# Patient Record
Sex: FEMALE | Race: WHITE
[De-identification: names, ages, dates, MRNs, and addresses within clinical notes are randomized per-mention and may not be internally consistent; named-entity substitution may affect disease eponyms.]

---

## 2016-11-09 VITALS — DIASTOLIC BLOOD PRESSURE: 62 MMHG | SYSTOLIC BLOOD PRESSURE: 126 MMHG

## 2017-05-24 ENCOUNTER — HOSPITAL ENCOUNTER (OUTPATIENT)
Dept: HOSPITAL 61 - LAB | Age: 72
Discharge: HOME | End: 2017-05-24
Attending: NURSE PRACTITIONER
Payer: MEDICARE

## 2017-05-24 DIAGNOSIS — E03.9: ICD-10-CM

## 2017-05-24 DIAGNOSIS — E78.5: ICD-10-CM

## 2017-05-24 DIAGNOSIS — E11.9: Primary | ICD-10-CM

## 2017-05-24 LAB
ALBUMIN SERPL-MCNC: 3.6 G/DL (ref 3.4–5)
ALBUMIN/GLOB SERPL: 0.9 {RATIO} (ref 1–1.7)
ALP SERPL-CCNC: 59 U/L (ref 46–116)
ALT SERPL-CCNC: 23 U/L (ref 14–59)
ANION GAP SERPL CALC-SCNC: 9 MMOL/L (ref 6–14)
AST SERPL-CCNC: 22 U/L (ref 15–37)
BILIRUB SERPL-MCNC: 0.4 MG/DL (ref 0.2–1)
BUN SERPL-MCNC: 20 MG/DL (ref 7–20)
BUN/CREAT SERPL: 20 (ref 6–20)
CALCIUM SERPL-MCNC: 9 MG/DL (ref 8.5–10.1)
CHLORIDE SERPL-SCNC: 104 MMOL/L (ref 98–107)
CHOLEST SERPL-MCNC: 130 MG/DL (ref 0–200)
CHOLEST/HDLC SERPL: 2.5 {RATIO}
CO2 SERPL-SCNC: 28 MMOL/L (ref 21–32)
CREAT SERPL-MCNC: 1 MG/DL (ref 0.6–1)
GFR SERPLBLD BASED ON 1.73 SQ M-ARVRAT: 54.3 ML/MIN
GLOBULIN SER-MCNC: 3.8 G/DL (ref 2.2–3.8)
GLUCOSE SERPL-MCNC: 161 MG/DL (ref 70–99)
HDLC SERPL-MCNC: 53 MG/DL (ref 40–60)
NONHDLC SERPL-MCNC: 77 MG/DL (ref 0–129)
POTASSIUM SERPL-SCNC: 4.3 MMOL/L (ref 3.5–5.1)
PROT SERPL-MCNC: 7.4 G/DL (ref 6.4–8.2)
SODIUM SERPL-SCNC: 141 MMOL/L (ref 136–145)
TRIGL SERPL-MCNC: 168 MG/DL (ref 0–150)

## 2017-05-24 PROCEDURE — 84443 ASSAY THYROID STIM HORMONE: CPT

## 2017-05-24 PROCEDURE — 80061 LIPID PANEL: CPT

## 2017-05-24 PROCEDURE — 36415 COLL VENOUS BLD VENIPUNCTURE: CPT

## 2017-05-24 PROCEDURE — 83036 HEMOGLOBIN GLYCOSYLATED A1C: CPT

## 2017-05-24 PROCEDURE — 80053 COMPREHEN METABOLIC PANEL: CPT

## 2017-07-28 ENCOUNTER — HOSPITAL ENCOUNTER (EMERGENCY)
Dept: HOSPITAL 61 - ER | Age: 72
Discharge: HOME | End: 2017-07-28
Payer: MEDICARE

## 2017-07-28 VITALS — SYSTOLIC BLOOD PRESSURE: 150 MMHG | DIASTOLIC BLOOD PRESSURE: 67 MMHG

## 2017-07-28 VITALS — BODY MASS INDEX: 34.15 KG/M2 | HEIGHT: 64 IN | WEIGHT: 200 LBS

## 2017-07-28 DIAGNOSIS — E11.9: ICD-10-CM

## 2017-07-28 DIAGNOSIS — R42: Primary | ICD-10-CM

## 2017-07-28 DIAGNOSIS — E03.9: ICD-10-CM

## 2017-07-28 DIAGNOSIS — Z88.1: ICD-10-CM

## 2017-07-28 DIAGNOSIS — I10: ICD-10-CM

## 2017-07-28 DIAGNOSIS — E78.00: ICD-10-CM

## 2017-07-28 DIAGNOSIS — R51: ICD-10-CM

## 2017-07-28 LAB
ALBUMIN SERPL-MCNC: 3.9 G/DL (ref 3.4–5)
ALP SERPL-CCNC: 64 U/L (ref 46–116)
ALT SERPL-CCNC: 23 U/L (ref 14–59)
ANION GAP SERPL CALC-SCNC: 10 MMOL/L (ref 6–14)
AST SERPL-CCNC: 19 U/L (ref 15–37)
BACTERIA #/AREA URNS HPF: 0 /HPF
BASOPHILS # BLD AUTO: 0.1 X10^3/UL (ref 0–0.2)
BASOPHILS NFR BLD: 1 % (ref 0–3)
BILIRUB DIRECT SERPL-MCNC: 0.1 MG/DL (ref 0–0.2)
BILIRUB SERPL-MCNC: 0.3 MG/DL (ref 0.2–1)
BILIRUB UR QL STRIP: NEGATIVE
BUN SERPL-MCNC: 17 MG/DL (ref 7–20)
CALCIUM SERPL-MCNC: 9.3 MG/DL (ref 8.5–10.1)
CHLORIDE SERPL-SCNC: 99 MMOL/L (ref 98–107)
CO2 SERPL-SCNC: 30 MMOL/L (ref 21–32)
CREAT SERPL-MCNC: 1 MG/DL (ref 0.6–1)
EOSINOPHIL NFR BLD: 3 % (ref 0–3)
ERYTHROCYTE [DISTWIDTH] IN BLOOD BY AUTOMATED COUNT: 12.7 % (ref 11.5–14.5)
GFR SERPLBLD BASED ON 1.73 SQ M-ARVRAT: 54.3 ML/MIN
GLUCOSE SERPL-MCNC: 139 MG/DL (ref 70–99)
GLUCOSE UR STRIP-MCNC: NEGATIVE MG/DL
HCT VFR BLD CALC: 35.7 % (ref 36–47)
HGB BLD-MCNC: 12.2 G/DL (ref 12–15.5)
LYMPHOCYTES # BLD: 2.1 X10^3/UL (ref 1–4.8)
LYMPHOCYTES NFR BLD AUTO: 30 % (ref 24–48)
MCH RBC QN AUTO: 32 PG (ref 25–35)
MCHC RBC AUTO-ENTMCNC: 34 G/DL (ref 31–37)
MCV RBC AUTO: 92 FL (ref 79–100)
MONOCYTES NFR BLD: 10 % (ref 0–9)
NEUTROPHILS NFR BLD AUTO: 56 % (ref 31–73)
NITRITE UR QL STRIP: NEGATIVE
PH UR STRIP: 7 [PH]
PLATELET # BLD AUTO: 207 X10^3/UL (ref 140–400)
POTASSIUM SERPL-SCNC: 3.6 MMOL/L (ref 3.5–5.1)
PROT SERPL-MCNC: 7.5 G/DL (ref 6.4–8.2)
PROT UR STRIP-MCNC: NEGATIVE MG/DL
RBC # BLD AUTO: 3.88 X10^6/UL (ref 3.5–5.4)
RBC #/AREA URNS HPF: 0 /HPF (ref 0–2)
SODIUM SERPL-SCNC: 139 MMOL/L (ref 136–145)
SP GR UR STRIP: 1.01
SQUAMOUS #/AREA URNS LPF: (no result) /LPF
UROBILINOGEN UR-MCNC: 0.2 MG/DL
WBC # BLD AUTO: 7 X10^3/UL (ref 4–11)
WBC #/AREA URNS HPF: (no result) /HPF (ref 0–4)

## 2017-07-28 PROCEDURE — 70496 CT ANGIOGRAPHY HEAD: CPT

## 2017-07-28 PROCEDURE — 99285 EMERGENCY DEPT VISIT HI MDM: CPT

## 2017-07-28 PROCEDURE — 96360 HYDRATION IV INFUSION INIT: CPT

## 2017-07-28 PROCEDURE — 80048 BASIC METABOLIC PNL TOTAL CA: CPT

## 2017-07-28 PROCEDURE — 81001 URINALYSIS AUTO W/SCOPE: CPT

## 2017-07-28 PROCEDURE — 70498 CT ANGIOGRAPHY NECK: CPT

## 2017-07-28 PROCEDURE — 36415 COLL VENOUS BLD VENIPUNCTURE: CPT

## 2017-07-28 PROCEDURE — 84484 ASSAY OF TROPONIN QUANT: CPT

## 2017-07-28 PROCEDURE — 85027 COMPLETE CBC AUTOMATED: CPT

## 2017-07-28 PROCEDURE — 80076 HEPATIC FUNCTION PANEL: CPT

## 2017-07-28 PROCEDURE — 93005 ELECTROCARDIOGRAM TRACING: CPT

## 2017-07-28 NOTE — EKG
[facility]

              [address] [place] [address]

Test Date:    2017               Test Time:    12:38:33

Pat Name:     LILIANA SHAIKH           Department:   

Patient ID:   [id number]           Room:          

Gender:       F                        Technician:   

:          1945               Requested By: ORLANDO ZHOU

Order Number: [id number]            Reading MD:   Howard Harris

                                 Measurements

Intervals                              Axis          

Rate:         66                       P:            32

NE:           160                      QRS:          -28

QRSD:         90                       T:            13

QT:           426                                    

QTc:          448                                    

                           Interpretive Statements

SINUS RHYTHM

LEFTWARD AXIS



Electronically Signed On 2017 13:44:59 CDT by Howard Harris

## 2017-07-28 NOTE — RAD
CTA of the head and neck with contrast, 7/28/2017:



History: Dizziness



Multidetector CT imaging was performed following an IV bolus injection of

iodinated contrast material. Multiplanar reconstructions were produced

including multiplanar MIP images and 3-D volume rendered reconstructions of

the major arteries.



Both common carotid arteries in the neck are widely patent. There is no

significant atherosclerotic plaquing at either carotid bifurcation. The distal

internal carotid arteries are widely patent up through the level of the [place]

of [place]. The middle cerebral and anterior cerebral arteries and their major

branches are unremarkable.



Both vertebral arteries in the neck are widely patent through their junction

with the basilar artery. The basilar artery shows no abnormality. The

posterior cerebral arteries and their major branches are unremarkable.



Moderate multilevel hypertrophic degenerative changes are present in the

spine.





IMPRESSION:

1. No significant atherosclerotic plaquing at either carotid bifurcation.

2. Widely patent vertebral arteries in the neck.

3. The intracranial arterial circulation is unremarkable.

## 2017-07-28 NOTE — PHYS DOC
Past Medical History


Past Medical History:  Bronchitis, Diabetes-Type II, High Cholesterol, 

Hypertension, Hypothyroid, Pneumonia, Other


Additional Past Medical Histor:  shingles


Past Surgical History:  Other


Additional Past Surgical Histo:  lung biopsy, D&C


Alcohol Use:  Rarely


Drug Use:  None





Adult General


Chief Complaint


Chief Complaint:  DIZZY/LIGHT HEADED





HPI


HPI





Patient is a 72  year old female with a history of diabetes and hypertension 

who started feeling dizzy yesterday and vomited several times yesterday at 

least 5 times, nonbloody. Today the patient was able to eat and give her food 

down. She states that she felt dizzy and wobbly walking from the car to the ED. 

Patient is also expressing a headache but she's had similar headaches in the 

past. She denies any head trauma, sick contacts, fevers, chills, rashes, 

diarrhea, recent travel. Patient does not describe any localized weakness. 

Patient does doesn't describe any ear pain. She thought that maybe room was 

spinning yesterday but is not doing so today.





Review of Systems


Review of Systems





Constitutional: Denies fever or chills []


Eyes: Denies change in visual acuity, redness, or eye pain []


HENT: Denies nasal congestion or sore throat []


Respiratory: Denies cough or shortness of breath []


Cardiovascular: No additional information not addressed in HPI []


GI: Denies abdominal pain, nausea, vomiting, bloody stools or diarrhea []


: Denies dysuria or hematuria []


Musculoskeletal: Denies back pain or joint pain []


Integument: Denies rash or skin lesions []


Neurologic: Denies headache, focal weakness or sensory changes []


Endocrine: Denies polyuria or polydipsia []





Current Medications


Current Medications





Current Medications








 Medications


  (Trade)  Dose


 Ordered  Sig/Calin  Start Time


 Stop Time Status Last Admin


Dose Admin


 


 Acetaminophen


  (Tylenol)  500 mg  1X  ONCE  7/28/17 13:00


 7/28/17 13:01 DC 7/28/17 13:05


500 MG


 


 Atenolol


  (Tenormin)  100 mg  1X  ONCE  7/28/17 13:15


 7/28/17 13:16 DC 7/28/17 13:21


100 MG


 


 Info


  (Do NOT chart on


 this entry -- for


 MONITORING)  1 each  PRN DAILY  PRN  7/28/17 15:15


 7/30/17 15:14   


 


 


 Iohexol


  (Omnipaque 350


 Mg/ml)  95 ml  1X  ONCE  7/28/17 15:15


 7/28/17 15:16 DC 7/28/17 15:30


75 ML


 


 Sodium Chloride  1,000 ml @ 


 1,000 mls/hr  1X  ONCE  7/28/17 12:45


 7/28/17 13:44 DC 7/28/17 12:51


1,000 MLS/HR











Allergies


Allergies





Allergies








Coded Allergies Type Severity Reaction Last Updated Verified


 


  amoxicillin Allergy Unknown  4/16/14 Yes











Physical Exam


Physical Exam





Constitutional: Well developed, well nourished, no acute distress, non-toxic 

appearance. []


HENT: Normocephalic, atraumatic, bilateral external ears normal, TM clear,, 

oropharynx dry, no oral exudates, nose normal. No signs of temporal arteritis


Eyes: PERRLA, EOMI, conjunctiva normal, no discharge. No nystagmus


Neck: Normal range of motion, no tenderness, supple, no stridor. No lad, no 

meningeal signs


Cardiovascular:Heart rate regular rhythm, no murmur, normal perfusion, no 

vascular insufficiency


Lungs & Thorax:  Bilateral breath sounds clear to auscultation, no tachypnea


Abdomen: Bowel sounds normal, soft, no tenderness, no masses, no pulsatile 

masses. [] 


Skin: Warm, dry, no erythema, no rash. [] 


Back: No tenderness, no CVA tenderness. [] 


Extremities: No tenderness, no cyanosis, no clubbing, ROM intact, no edema. [] 


Neurologic: Alert and oriented X 3, normal motor function, normal gross sensory 

function, no focal deficits noted. No pronator drift, finger-to-nose within 

normal limits


Psychologic: Affect normal, judgement normal, mood normal. []





Current Patient Data


Vital Signs





 Vital Signs








  Date Time  Temp Pulse Resp B/P (MAP) Pulse Ox O2 Delivery O2 Flow Rate FiO2


 


7/28/17 14:44  58 16 165/77 (106) 96 Room Air  


 


7/28/17 12:35 98.4       





 98.4       








Lab Values





 Laboratory Tests








Test


  7/28/17


12:36 7/28/17


12:45 7/28/17


13:20


 


Urine Collection Type Unknown    


 


Urine Color Yellow    


 


Urine Clarity Clear    


 


Urine pH 7.0    


 


Urine Specific Gravity 1.010    


 


Urine Protein


  Negative mg/dL


(NEG-TRACE) 


  


 


 


Urine Glucose (UA)


  Negative mg/dL


(NEG) 


  


 


 


Urine Ketones (Stick)


  Negative mg/dL


(NEG) 


  


 


 


Urine Blood


  Negative (NEG)


  


  


 


 


Urine Nitrite


  Negative (NEG)


  


  


 


 


Urine Bilirubin


  Negative (NEG)


  


  


 


 


Urine Urobilinogen Dipstick


  0.2 mg/dL (0.2


mg/dL) 


  


 


 


Urine Leukocyte Esterase


  Negative (NEG)


  


  


 


 


Urine RBC 0 /HPF (0-2)    


 


Urine WBC


  1-4 /HPF (0-4)


  


  


 


 


Urine Squamous Epithelial


Cells Few /LPF  


  


  


 


 


Urine Bacteria


  0 /HPF (0-FEW)


  


  


 


 


White Blood Count


  


  7.0 x10^3/uL


(4.0-11.0) 


 


 


Red Blood Count


  


  3.88 x10^6/uL


(3.50-5.40) 


 


 


Hemoglobin


  


  12.2 g/dL


(12.0-15.5) 


 


 


Hematocrit


  


  35.7 %


(36.0-47.0)  L 


 


 


Mean Corpuscular Volume


  


  92 fL ()


  


 


 


Mean Corpuscular Hemoglobin  32 pg (25-35)   


 


Mean Corpuscular Hemoglobin


Concent 


  34 g/dL


(31-37) 


 


 


Red Cell Distribution Width


  


  12.7 %


(11.5-14.5) 


 


 


Platelet Count


  


  207 x10^3/uL


(140-400) 


 


 


Neutrophils (%) (Auto)  56 % (31-73)   


 


Lymphocytes (%) (Auto)  30 % (24-48)   


 


Monocytes (%) (Auto)  10 % (0-9)  H 


 


Eosinophils (%) (Auto)  3 % (0-3)   


 


Basophils (%) (Auto)  1 % (0-3)   


 


Neutrophils # (Auto)


  


  3.9 x10^3uL


(1.8-7.7) 


 


 


Lymphocytes # (Auto)


  


  2.1 x10^3/uL


(1.0-4.8) 


 


 


Monocytes # (Auto)


  


  0.7 x10^3/uL


(0.0-1.1) 


 


 


Eosinophils # (Auto)


  


  0.2 x10^3/uL


(0.0-0.7) 


 


 


Basophils # (Auto)


  


  0.1 x10^3/uL


(0.0-0.2) 


 


 


Sodium Level


  


  


  139 mmol/L


(136-145)


 


Potassium Level


  


  


  3.6 mmol/L


(3.5-5.1)


 


Chloride Level


  


  


  99 mmol/L


()


 


Carbon Dioxide Level


  


  


  30 mmol/L


(21-32)


 


Anion Gap   10 (6-14)  


 


Blood Urea Nitrogen


  


  


  17 mg/dL


(7-20)


 


Creatinine


  


  


  1.0 mg/dL


(0.6-1.0)


 


Estimated GFR


(Cockcroft-Gault) 


  


  54.3  


 


 


Glucose Level


  


  


  139 mg/dL


(70-99)  H


 


Calcium Level


  


  


  9.3 mg/dL


(8.5-10.1)


 


Total Bilirubin


  


  


  0.3 mg/dL


(0.2-1.0)


 


Direct Bilirubin


  


  


  0.1 mg/dL


(0.0-0.2)


 


Aspartate Amino Transferase


(AST) 


  


  19 U/L (15-37)


 


 


Alanine Aminotransferase (ALT)


  


  


  23 U/L (14-59)


 


 


Alkaline Phosphatase


  


  


  64 U/L


()


 


Troponin I Quantitative


  


  


  < 0.017 ng/mL


(0.000-0.055)


 


Total Protein


  


  


  7.5 g/dL


(6.4-8.2)


 


Albumin


  


  


  3.9 g/dL


(3.4-5.0)





 Laboratory Tests


7/28/17 12:45








 Laboratory Tests


7/28/17 13:20











EKG


EKG


66, sinus rhythm, no standing, EP interpretation at 1239 []





Radiology/Procedures


Radiology/Procedures


[]





Course & Med Decision Making


Course & Med Decision Making


Pertinent Labs and Imaging studies reviewed. (See chart for details)





1630 I updated the patient on the results of the labs and the CT scan. Patient 

reports she is still feeling dizzy and feels like the room is moving, I then 

reevaluated her and I still didn't notice any nystagmus. Patient was ambulated 

in the ED was able to ambulate and did not display any signs of limited 

coordination or lack of strength. I am unsure as to the etiology of the patient'

s symptoms at this time however after observing her in the ER for several hours 

and with the results of the physical exam including the neurological exam as 

well as the testing we did I do not feel the patient is in need for inpatient 

evaluation at this time or further imaging. However, I have given the patient 

and her family strict return precautions and I have asked her to follow up with 

her PCP. I explained this may be an early process and we cannot be sure how it 

will progress however if she has any worsening symptoms or new concerns arise 

she is to return to the ED immediately. Patient and Family are agreeable and 

have no reservations of discharge home and agreed to follow up as directed





[]





Dragon Disclaimer


Dragon Disclaimer


This electronic medical record was generated, in whole or in part, using a 

voice recognition dictation system.





Departure


Departure


Impression:  


 Primary Impression:  


 Dizziness, nonspecific


Disposition:  01 HOME, SELF-CARE


Condition:  STABLE


Referrals:  


BELA HERNANDEZ MD (PCP)


follow up in 2-3 days for recheck and re-evaluation.  If symptoms worsen or new 

symptoms arise return to the ED immediately


Patient Instructions:  Dizziness











ORLANDO ZHOU MD Jul 28, 2017 12:54

## 2018-03-21 ENCOUNTER — HOSPITAL ENCOUNTER (OUTPATIENT)
Dept: HOSPITAL 61 - KCIC US | Age: 73
Discharge: HOME | End: 2018-03-21
Attending: NURSE PRACTITIONER
Payer: MEDICARE

## 2018-03-21 DIAGNOSIS — R22.1: Primary | ICD-10-CM

## 2018-03-21 PROCEDURE — 76536 US EXAM OF HEAD AND NECK: CPT

## 2018-07-20 ENCOUNTER — HOSPITAL ENCOUNTER (OUTPATIENT)
Dept: HOSPITAL 61 - ENDOS | Age: 73
Discharge: HOME | End: 2018-07-20
Attending: INTERNAL MEDICINE
Payer: MEDICARE

## 2018-07-20 VITALS — DIASTOLIC BLOOD PRESSURE: 80 MMHG | SYSTOLIC BLOOD PRESSURE: 143 MMHG

## 2018-07-20 DIAGNOSIS — Z79.899: ICD-10-CM

## 2018-07-20 DIAGNOSIS — K63.5: Primary | ICD-10-CM

## 2018-07-20 DIAGNOSIS — Z88.0: ICD-10-CM

## 2018-07-20 DIAGNOSIS — Z87.01: ICD-10-CM

## 2018-07-20 DIAGNOSIS — E11.9: ICD-10-CM

## 2018-07-20 DIAGNOSIS — K21.9: ICD-10-CM

## 2018-07-20 DIAGNOSIS — I10: ICD-10-CM

## 2018-07-20 DIAGNOSIS — K57.30: ICD-10-CM

## 2018-07-20 DIAGNOSIS — E03.9: ICD-10-CM

## 2018-07-20 DIAGNOSIS — Z82.49: ICD-10-CM

## 2018-07-20 DIAGNOSIS — M19.90: ICD-10-CM

## 2018-07-20 DIAGNOSIS — Z88.1: ICD-10-CM

## 2018-07-20 DIAGNOSIS — K64.0: ICD-10-CM

## 2018-07-20 DIAGNOSIS — E78.00: ICD-10-CM

## 2018-07-20 DIAGNOSIS — Z98.890: ICD-10-CM

## 2018-07-20 DIAGNOSIS — Z72.89: ICD-10-CM

## 2018-07-20 DIAGNOSIS — Z83.3: ICD-10-CM

## 2018-07-20 DIAGNOSIS — Z79.84: ICD-10-CM

## 2018-07-20 LAB — POC GLUCOSE: 123 MG/DL (ref 70–99)

## 2018-07-20 PROCEDURE — 45380 COLONOSCOPY AND BIOPSY: CPT

## 2018-07-20 PROCEDURE — 82962 GLUCOSE BLOOD TEST: CPT

## 2018-07-20 PROCEDURE — 88305 TISSUE EXAM BY PATHOLOGIST: CPT

## 2018-07-20 RX ADMIN — SODIUM CHLORIDE, SODIUM LACTATE, POTASSIUM CHLORIDE, AND CALCIUM CHLORIDE 1 MLS/HR: .6; .31; .03; .02 INJECTION, SOLUTION INTRAVENOUS at 07:04

## 2019-10-07 ENCOUNTER — HOSPITAL ENCOUNTER (OUTPATIENT)
Dept: HOSPITAL 61 - KCIC | Age: 74
Discharge: HOME | End: 2019-10-07
Attending: NURSE PRACTITIONER
Payer: MEDICARE

## 2019-10-07 DIAGNOSIS — R09.89: Primary | ICD-10-CM

## 2019-10-07 PROCEDURE — 71046 X-RAY EXAM CHEST 2 VIEWS: CPT

## 2021-03-02 NOTE — [facility]
EXAM: Chest, 2 views.

 

HISTORY: Abnormal lung sounds.

 

COMPARISON: 11/7/2016

 

FINDINGS: 2 views of the chest are obtained. There is no infiltrate, 

pleural effusion or pneumothorax. There is stable suspected scarring 

within the lingula and left lower lobe. There is a tortuous thoracic 

aorta.

 

IMPRESSION: No acute pulmonary finding.

 

Electronically signed by: Tahira Rodriguez MD (10/7/2019 1:31 PM) [facility]
Admission